# Patient Record
Sex: FEMALE | Race: WHITE | NOT HISPANIC OR LATINO | URBAN - NONMETROPOLITAN AREA
[De-identification: names, ages, dates, MRNs, and addresses within clinical notes are randomized per-mention and may not be internally consistent; named-entity substitution may affect disease eponyms.]

---

## 2017-06-26 PROBLEM — H52.03: Noted: 2017-06-26

## 2017-06-26 PROBLEM — H52.4: Noted: 2017-06-26

## 2017-06-26 PROBLEM — H25.13: Noted: 2019-11-18

## 2017-06-26 PROBLEM — H52.223: Noted: 2017-06-26

## 2019-11-18 ENCOUNTER — IMPORTED ENCOUNTER (OUTPATIENT)
Dept: URBAN - NONMETROPOLITAN AREA CLINIC 1 | Facility: CLINIC | Age: 59
End: 2019-11-18

## 2019-11-18 PROCEDURE — 92014 COMPRE OPH EXAM EST PT 1/>: CPT

## 2019-11-18 PROCEDURE — 92015 DETERMINE REFRACTIVE STATE: CPT

## 2019-11-18 NOTE — PATIENT DISCUSSION
Compound Hyperopic Astigmatism OU w/Presbyopia-  discussed findings w/patient-  new spectacle Rx issued-  continue to monitor yearly or prnCataracts OU-  discussed findings w/patient-  no treatment indicated at this time-  UV protection recommended-  continue to monitor yearly or prn; 's Notes: MR 11/18/2019DFE 11/18/2019

## 2021-02-24 ENCOUNTER — IMPORTED ENCOUNTER (OUTPATIENT)
Dept: URBAN - NONMETROPOLITAN AREA CLINIC 1 | Facility: CLINIC | Age: 61
End: 2021-02-24

## 2021-02-24 PROCEDURE — 92015 DETERMINE REFRACTIVE STATE: CPT

## 2021-02-24 PROCEDURE — 92014 COMPRE OPH EXAM EST PT 1/>: CPT

## 2021-02-24 NOTE — PATIENT DISCUSSION
Compound Hyperopic Astigmatism OU w/Presbyopia-  discussed findings w/patient-  new spectacle Rx issued-  patient does not want full add she is happier with +1.50 add because most of her work is at computer distance. -  continue to monitor yearly or prnCataracts OU-  discussed findings w/patient-  no treatment indicated at this time-  UV protection recommended-  continue to monitor yearly or prn; 's Notes: MR 2/24/2021DFE 2/24/2021

## 2021-03-30 ENCOUNTER — IMPORTED ENCOUNTER (OUTPATIENT)
Dept: URBAN - NONMETROPOLITAN AREA CLINIC 1 | Facility: CLINIC | Age: 61
End: 2021-03-30

## 2021-03-30 ENCOUNTER — PREPPED CHART (OUTPATIENT)
Dept: URBAN - NONMETROPOLITAN AREA CLINIC 4 | Facility: CLINIC | Age: 61
End: 2021-03-30

## 2021-03-30 NOTE — PATIENT DISCUSSION
Rx Check-  discussed findings w/ patient -  no change in Rx per NL-  at patient's request glassess for computer/ NVO-  RTC as scheduled; 's Notes: MR 3/30/2021DFE 2/24/2021

## 2022-02-24 ASSESSMENT — VISUAL ACUITY
OU_CC: 20/20
OS_CC: 20/25+2
OD_CC: 20/25+2
OU_CC: 20/20

## 2022-02-24 ASSESSMENT — TONOMETRY
OD_IOP_MMHG: 13
OS_IOP_MMHG: 13

## 2022-02-28 ENCOUNTER — COMPREHENSIVE EXAM (OUTPATIENT)
Dept: URBAN - NONMETROPOLITAN AREA CLINIC 4 | Facility: CLINIC | Age: 62
End: 2022-02-28

## 2022-02-28 DIAGNOSIS — H52.4: ICD-10-CM

## 2022-02-28 DIAGNOSIS — H52.223: ICD-10-CM

## 2022-02-28 DIAGNOSIS — H52.03: ICD-10-CM

## 2022-02-28 PROCEDURE — 92015 DETERMINE REFRACTIVE STATE: CPT

## 2022-02-28 PROCEDURE — 92014 COMPRE OPH EXAM EST PT 1/>: CPT

## 2022-02-28 ASSESSMENT — VISUAL ACUITY
OD_SC: 20/25
OS_SC: 20/30-2

## 2022-02-28 ASSESSMENT — TONOMETRY
OS_IOP_MMHG: 14
OD_IOP_MMHG: 15

## 2022-04-09 ASSESSMENT — VISUAL ACUITY
OD_SC: 20/20
OS_SC: 20/20-
OD_SC: 20/25+2
OS_SC: 20/20
OU_CC: 20/20
OU_CC: 20/20
OU_SC: 20/20
OD_SC: 20/20
OS_SC: 20/25+2
OU_SC: 20/20

## 2022-04-09 ASSESSMENT — TONOMETRY
OD_IOP_MMHG: 14
OD_IOP_MMHG: 13
OS_IOP_MMHG: 13
OS_IOP_MMHG: 13

## 2023-09-12 ENCOUNTER — COMPREHENSIVE EXAM (OUTPATIENT)
Dept: RURAL CLINIC 1 | Facility: CLINIC | Age: 63
End: 2023-09-12

## 2023-09-12 DIAGNOSIS — H52.223: ICD-10-CM

## 2023-09-12 DIAGNOSIS — H52.03: ICD-10-CM

## 2023-09-12 DIAGNOSIS — H52.4: ICD-10-CM

## 2023-09-12 DIAGNOSIS — H25.13: ICD-10-CM

## 2023-09-12 PROCEDURE — 92015 DETERMINE REFRACTIVE STATE: CPT

## 2023-09-12 PROCEDURE — 92014 COMPRE OPH EXAM EST PT 1/>: CPT

## 2023-09-12 ASSESSMENT — VISUAL ACUITY
OD_CC: 20/20
OD_CC: 20/25
OU_CC: 20/20
OU_CC: 20/20
OS_CC: 20/20
OS_CC: 20/50

## 2023-09-12 ASSESSMENT — TONOMETRY
OD_IOP_MMHG: 15
OS_IOP_MMHG: 15